# Patient Record
Sex: MALE | Race: WHITE | URBAN - METROPOLITAN AREA
[De-identification: names, ages, dates, MRNs, and addresses within clinical notes are randomized per-mention and may not be internally consistent; named-entity substitution may affect disease eponyms.]

---

## 2023-02-16 ENCOUNTER — OFFICE (OUTPATIENT)
Dept: URBAN - METROPOLITAN AREA CLINIC 112 | Facility: CLINIC | Age: 58
Setting detail: OPHTHALMOLOGY
End: 2023-02-16
Payer: COMMERCIAL

## 2023-02-16 DIAGNOSIS — H53.2: ICD-10-CM

## 2023-02-16 DIAGNOSIS — H35.033: ICD-10-CM

## 2023-02-16 DIAGNOSIS — E11.3392: ICD-10-CM

## 2023-02-16 DIAGNOSIS — H17.9: ICD-10-CM

## 2023-02-16 DIAGNOSIS — H16.223: ICD-10-CM

## 2023-02-16 DIAGNOSIS — E11.3391: ICD-10-CM

## 2023-02-16 PROCEDURE — 99214 OFFICE O/P EST MOD 30 MIN: CPT | Performed by: OPHTHALMOLOGY

## 2023-02-16 PROCEDURE — 92250 FUNDUS PHOTOGRAPHY W/I&R: CPT | Performed by: OPHTHALMOLOGY

## 2023-02-16 ASSESSMENT — CONFRONTATIONAL VISUAL FIELD TEST (CVF)
OD_FINDINGS: FULL
OS_FINDINGS: FULL

## 2023-02-16 ASSESSMENT — SPHEQUIV_DERIVED
OS_SPHEQUIV: 1
OS_SPHEQUIV: 0.875
OD_SPHEQUIV: 0.625
OD_SPHEQUIV: 0.25

## 2023-02-16 ASSESSMENT — REFRACTION_MANIFEST
OS_VA1: 20/25-2
OS_AXIS: 070
OD_VA1: 20/20
OS_CYLINDER: -0.50
OS_SPHERE: +1.25
OD_AXIS: 100
OD_CYLINDER: -1.25
OD_SPHERE: +1.25

## 2023-02-16 ASSESSMENT — KERATOMETRY
OS_K1POWER_DIOPTERS: 40.00
OS_AXISANGLE_DEGREES: 135
OD_K2POWER_DIOPTERS: 41.00
OS_K2POWER_DIOPTERS: 40.25
OD_AXISANGLE_DEGREES: 037
OD_K1POWER_DIOPTERS: 40.25

## 2023-02-16 ASSESSMENT — TONOMETRY: OS_IOP_MMHG: 14

## 2023-02-16 ASSESSMENT — REFRACTION_AUTOREFRACTION
OS_CYLINDER: -0.75
OS_AXIS: 070
OS_SPHERE: +1.25
OD_CYLINDER: -1.00
OD_SPHERE: +0.75
OD_AXIS: 099

## 2023-02-16 ASSESSMENT — VISUAL ACUITY
OS_BCVA: 20/20-1
OD_BCVA: 20/25

## 2023-02-16 ASSESSMENT — AXIALLENGTH_DERIVED
OD_AL: 24.4334
OS_AL: 24.4737
OS_AL: 24.5263
OD_AL: 24.5911

## 2023-02-16 ASSESSMENT — SUPERFICIAL PUNCTATE KERATITIS (SPK)
OS_SPK: T
OD_SPK: T

## 2023-02-20 ENCOUNTER — OFFICE (OUTPATIENT)
Dept: URBAN - METROPOLITAN AREA CLINIC 63 | Facility: CLINIC | Age: 58
Setting detail: OPHTHALMOLOGY
End: 2023-02-20
Payer: COMMERCIAL

## 2023-02-20 DIAGNOSIS — H43.393: ICD-10-CM

## 2023-02-20 DIAGNOSIS — H35.373: ICD-10-CM

## 2023-02-20 DIAGNOSIS — E11.3313: ICD-10-CM

## 2023-02-20 DIAGNOSIS — H35.033: ICD-10-CM

## 2023-02-20 DIAGNOSIS — E11.3311: ICD-10-CM

## 2023-02-20 PROCEDURE — 92134 CPTRZ OPH DX IMG PST SGM RTA: CPT | Performed by: OPHTHALMOLOGY

## 2023-02-20 PROCEDURE — 67210 TREATMENT OF RETINAL LESION: CPT | Performed by: OPHTHALMOLOGY

## 2023-02-20 PROCEDURE — 92235 FLUORESCEIN ANGRPH MLTIFRAME: CPT | Performed by: OPHTHALMOLOGY

## 2023-02-20 PROCEDURE — 92014 COMPRE OPH EXAM EST PT 1/>: CPT | Performed by: OPHTHALMOLOGY

## 2023-02-20 ASSESSMENT — SPHEQUIV_DERIVED
OS_SPHEQUIV: 0.875
OD_SPHEQUIV: 0.25

## 2023-02-20 ASSESSMENT — AXIALLENGTH_DERIVED
OS_AL: 24.5263
OD_AL: 24.5911

## 2023-02-20 ASSESSMENT — KERATOMETRY
OD_K2POWER_DIOPTERS: 41.00
OD_AXISANGLE_DEGREES: 037
OS_AXISANGLE_DEGREES: 135
OD_K1POWER_DIOPTERS: 40.25
OS_K2POWER_DIOPTERS: 40.25
OS_K1POWER_DIOPTERS: 40.00

## 2023-02-20 ASSESSMENT — REFRACTION_AUTOREFRACTION
OS_CYLINDER: -0.75
OD_AXIS: 099
OS_SPHERE: +1.25
OS_AXIS: 070
OD_SPHERE: +0.75
OD_CYLINDER: -1.00

## 2023-02-20 ASSESSMENT — TONOMETRY
OD_IOP_MMHG: 16
OS_IOP_MMHG: 16

## 2023-02-20 ASSESSMENT — VISUAL ACUITY
OD_BCVA: 20/30
OS_BCVA: 20/25

## 2023-02-20 ASSESSMENT — SUPERFICIAL PUNCTATE KERATITIS (SPK)
OS_SPK: T
OD_SPK: T

## 2023-02-20 ASSESSMENT — CONFRONTATIONAL VISUAL FIELD TEST (CVF)
OD_FINDINGS: FULL
OS_FINDINGS: FULL

## 2023-03-06 ENCOUNTER — OFFICE (OUTPATIENT)
Facility: LOCATION | Age: 58
Setting detail: OPHTHALMOLOGY
End: 2023-03-06
Payer: COMMERCIAL

## 2023-03-06 DIAGNOSIS — H53.2: ICD-10-CM

## 2023-03-06 DIAGNOSIS — S04.22XA: ICD-10-CM

## 2023-03-06 DIAGNOSIS — H53.433: ICD-10-CM

## 2023-03-06 PROBLEM — H35.373 EPIRETINAL MEMBRANE; BOTH EYES: Status: ACTIVE | Noted: 2023-02-20

## 2023-03-06 PROBLEM — H16.223 DRY EYE SYNDROME K SICCA; BOTH EYES: Status: ACTIVE | Noted: 2023-02-16

## 2023-03-06 PROBLEM — H43.393 VITREOUS FLOATERS; BOTH EYES: Status: ACTIVE | Noted: 2023-02-20

## 2023-03-06 PROBLEM — E11.3311 DM TYPE 2; RIGHT MOD WITH ME, LEFT MOD WITH ME: Status: ACTIVE | Noted: 2023-02-20

## 2023-03-06 PROBLEM — E11.3312 DM TYPE 2; RIGHT MOD WITH ME, LEFT MOD WITH ME: Status: ACTIVE | Noted: 2023-02-20

## 2023-03-06 PROCEDURE — 92133 CPTRZD OPH DX IMG PST SGM ON: CPT | Performed by: OPHTHALMOLOGY

## 2023-03-06 PROCEDURE — 92083 EXTENDED VISUAL FIELD XM: CPT | Performed by: OPHTHALMOLOGY

## 2023-03-06 PROCEDURE — 92012 INTRM OPH EXAM EST PATIENT: CPT | Performed by: OPHTHALMOLOGY

## 2023-03-06 ASSESSMENT — SUPERFICIAL PUNCTATE KERATITIS (SPK)
OD_SPK: T
OS_SPK: T

## 2023-03-06 ASSESSMENT — CONFRONTATIONAL VISUAL FIELD TEST (CVF)
OS_FINDINGS: FULL
OD_FINDINGS: FULL

## 2023-03-09 ASSESSMENT — REFRACTION_MANIFEST
OS_AXIS: 085
OS_SPHERE: +0.75
OS_VA1: 20/25
OD_CYLINDER: -1.25
OS_CYLINDER: -0.75
OD_AXIS: 100
OD_SPHERE: +1.50
OD_VA1: 20/25

## 2023-03-09 ASSESSMENT — KERATOMETRY
OD_K1POWER_DIOPTERS: 400.50
OD_K2POWER_DIOPTERS: 39.75
OS_K2POWER_DIOPTERS: 39.00
OS_K1POWER_DIOPTERS: 39.75
OS_AXISANGLE_DEGREES: 69
OD_AXISANGLE_DEGREES: 121

## 2023-03-09 ASSESSMENT — REFRACTION_AUTOREFRACTION
OS_SPHERE: +1.50
OS_CYLINDER: -0.75
OS_AXIS: 85
OD_SPHERE: +1.50
OD_CYLINDER: -1.25
OD_AXIS: 99

## 2023-03-09 ASSESSMENT — AXIALLENGTH_DERIVED
OS_AL: 24.7202
OD_AL: 6.27
OS_AL: 25.0453
OD_AL: 6.27

## 2023-03-09 ASSESSMENT — VISUAL ACUITY
OD_BCVA: 20/30
OS_BCVA: 20/25

## 2023-03-09 ASSESSMENT — SPHEQUIV_DERIVED
OS_SPHEQUIV: 1.125
OD_SPHEQUIV: 0.875
OD_SPHEQUIV: 0.875
OS_SPHEQUIV: 0.375

## 2023-03-10 ENCOUNTER — OFFICE (OUTPATIENT)
Dept: URBAN - METROPOLITAN AREA CLINIC 105 | Facility: CLINIC | Age: 58
Setting detail: OPHTHALMOLOGY
End: 2023-03-10
Payer: COMMERCIAL

## 2023-03-10 DIAGNOSIS — E11.3312: ICD-10-CM

## 2023-03-10 PROCEDURE — 67210 TREATMENT OF RETINAL LESION: CPT | Performed by: OPHTHALMOLOGY

## 2023-03-10 ASSESSMENT — VISUAL ACUITY
OD_BCVA: 20/30
OS_BCVA: 20/30

## 2023-03-10 ASSESSMENT — KERATOMETRY
OD_K1POWER_DIOPTERS: 400.50
OS_AXISANGLE_DEGREES: 69
OD_AXISANGLE_DEGREES: 121
OS_K2POWER_DIOPTERS: 39.00
OS_K1POWER_DIOPTERS: 39.75
OD_K2POWER_DIOPTERS: 39.75

## 2023-03-10 ASSESSMENT — REFRACTION_AUTOREFRACTION
OS_SPHERE: +1.50
OS_CYLINDER: -0.75
OS_AXIS: 85
OD_CYLINDER: -1.25
OD_AXIS: 99
OD_SPHERE: +1.50

## 2023-03-10 ASSESSMENT — CONFRONTATIONAL VISUAL FIELD TEST (CVF)
OS_FINDINGS: FULL
OD_FINDINGS: FULL

## 2023-03-10 ASSESSMENT — SPHEQUIV_DERIVED
OD_SPHEQUIV: 0.875
OS_SPHEQUIV: 1.125

## 2023-03-10 ASSESSMENT — AXIALLENGTH_DERIVED
OD_AL: 6.27
OS_AL: 24.7202

## 2023-03-10 ASSESSMENT — SUPERFICIAL PUNCTATE KERATITIS (SPK)
OS_SPK: T
OD_SPK: T

## 2023-03-15 ENCOUNTER — OFFICE (OUTPATIENT)
Dept: URBAN - METROPOLITAN AREA CLINIC 105 | Facility: CLINIC | Age: 58
Setting detail: OPHTHALMOLOGY
End: 2023-03-15
Payer: COMMERCIAL

## 2023-03-15 DIAGNOSIS — E11.3313: ICD-10-CM

## 2023-03-15 DIAGNOSIS — H43.393: ICD-10-CM

## 2023-03-15 DIAGNOSIS — H35.373: ICD-10-CM

## 2023-03-15 DIAGNOSIS — E11.3311: ICD-10-CM

## 2023-03-15 DIAGNOSIS — H35.033: ICD-10-CM

## 2023-03-15 PROCEDURE — J3490 CIME: Performed by: OPHTHALMOLOGY

## 2023-03-15 PROCEDURE — 92250 FUNDUS PHOTOGRAPHY W/I&R: CPT | Performed by: OPHTHALMOLOGY

## 2023-03-15 PROCEDURE — 67028 INJECTION EYE DRUG: CPT | Performed by: OPHTHALMOLOGY

## 2023-03-15 ASSESSMENT — CONFRONTATIONAL VISUAL FIELD TEST (CVF)
OS_FINDINGS: FULL
OD_FINDINGS: FULL

## 2023-03-15 ASSESSMENT — KERATOMETRY
OD_K2POWER_DIOPTERS: 39.75
OS_K2POWER_DIOPTERS: 39.00
OD_K1POWER_DIOPTERS: 400.50
OS_K1POWER_DIOPTERS: 39.75
OS_AXISANGLE_DEGREES: 69
OD_AXISANGLE_DEGREES: 121

## 2023-03-15 ASSESSMENT — VISUAL ACUITY
OS_BCVA: 20/30-2
OD_BCVA: 20/50

## 2023-03-15 ASSESSMENT — SUPERFICIAL PUNCTATE KERATITIS (SPK)
OD_SPK: T
OS_SPK: T

## 2023-03-24 ENCOUNTER — OFFICE (OUTPATIENT)
Dept: URBAN - METROPOLITAN AREA CLINIC 105 | Facility: CLINIC | Age: 58
Setting detail: OPHTHALMOLOGY
End: 2023-03-24
Payer: COMMERCIAL

## 2023-03-24 DIAGNOSIS — E11.3312: ICD-10-CM

## 2023-03-24 DIAGNOSIS — E11.3313: ICD-10-CM

## 2023-03-24 DIAGNOSIS — H43.393: ICD-10-CM

## 2023-03-24 DIAGNOSIS — H35.373: ICD-10-CM

## 2023-03-24 DIAGNOSIS — H35.033: ICD-10-CM

## 2023-03-24 PROCEDURE — J3490 CIME: Performed by: OPHTHALMOLOGY

## 2023-03-24 PROCEDURE — 67028 INJECTION EYE DRUG: CPT | Performed by: OPHTHALMOLOGY

## 2023-03-24 PROCEDURE — 92134 CPTRZ OPH DX IMG PST SGM RTA: CPT | Performed by: OPHTHALMOLOGY

## 2023-03-24 ASSESSMENT — KERATOMETRY
OS_AXISANGLE_DEGREES: 69
OS_K1POWER_DIOPTERS: 39.75
OD_K1POWER_DIOPTERS: 400.50
OD_K2POWER_DIOPTERS: 39.75
OS_K2POWER_DIOPTERS: 39.00
OD_AXISANGLE_DEGREES: 121

## 2023-03-24 ASSESSMENT — SUPERFICIAL PUNCTATE KERATITIS (SPK)
OD_SPK: T
OS_SPK: T

## 2023-03-24 ASSESSMENT — VISUAL ACUITY
OD_BCVA: 20/50+1
OS_BCVA: 20/25-1

## 2023-03-24 ASSESSMENT — CONFRONTATIONAL VISUAL FIELD TEST (CVF)
OS_FINDINGS: FULL
OD_FINDINGS: FULL

## 2023-03-29 ENCOUNTER — OFFICE (OUTPATIENT)
Dept: URBAN - METROPOLITAN AREA CLINIC 112 | Facility: CLINIC | Age: 58
Setting detail: OPHTHALMOLOGY
End: 2023-03-29
Payer: COMMERCIAL

## 2023-03-29 DIAGNOSIS — H16.223: ICD-10-CM

## 2023-03-29 DIAGNOSIS — H16.221: ICD-10-CM

## 2023-03-29 DIAGNOSIS — H16.222: ICD-10-CM

## 2023-03-29 DIAGNOSIS — H25.13: ICD-10-CM

## 2023-03-29 PROBLEM — E11.3312 DM TYPE 2; RIGHT MOD WITH ME, LEFT MOD WITH ME: Status: ACTIVE | Noted: 2023-03-15

## 2023-03-29 PROBLEM — H53.433 ARCUATE DEFECT; BOTH EYES: Status: ACTIVE | Noted: 2023-03-06

## 2023-03-29 PROBLEM — E11.3311 DM TYPE 2; RIGHT MOD WITH ME, LEFT MOD WITH ME: Status: ACTIVE | Noted: 2023-03-15

## 2023-03-29 PROBLEM — E11.3313 DM TYPE 2; RIGHT MOD WITH ME, LEFT MOD WITH ME: Status: ACTIVE | Noted: 2023-03-15

## 2023-03-29 PROCEDURE — 99213 OFFICE O/P EST LOW 20 MIN: CPT | Performed by: OPHTHALMOLOGY

## 2023-03-29 PROCEDURE — 83861 MICROFLUID ANALY TEARS: CPT | Performed by: OPHTHALMOLOGY

## 2023-03-29 ASSESSMENT — AXIALLENGTH_DERIVED
OS_AL: 24.8277
OD_AL: 6.27

## 2023-03-29 ASSESSMENT — TONOMETRY
OD_IOP_MMHG: 17
OS_IOP_MMHG: 14

## 2023-03-29 ASSESSMENT — REFRACTION_AUTOREFRACTION
OD_SPHERE: +1.25
OD_AXIS: 99
OS_AXIS: 075
OS_CYLINDER: -0.75
OD_CYLINDER: -1.25
OS_SPHERE: +1.25

## 2023-03-29 ASSESSMENT — KERATOMETRY
OD_K2POWER_DIOPTERS: 39.75
OS_K1POWER_DIOPTERS: 39.75
OS_AXISANGLE_DEGREES: 69
OS_K2POWER_DIOPTERS: 39.00
OD_K1POWER_DIOPTERS: 400.50
OD_AXISANGLE_DEGREES: 121

## 2023-03-29 ASSESSMENT — VISUAL ACUITY
OS_BCVA: 20/20
OD_BCVA: 20/40+1

## 2023-03-29 ASSESSMENT — CONFRONTATIONAL VISUAL FIELD TEST (CVF)
OS_FINDINGS: FULL
OD_FINDINGS: FULL

## 2023-03-29 ASSESSMENT — SUPERFICIAL PUNCTATE KERATITIS (SPK)
OD_SPK: T
OS_SPK: T

## 2023-03-29 ASSESSMENT — SPHEQUIV_DERIVED
OD_SPHEQUIV: 0.625
OS_SPHEQUIV: 0.875

## 2023-04-14 PROBLEM — Z00.00 ENCOUNTER FOR PREVENTIVE HEALTH EXAMINATION: Status: ACTIVE | Noted: 2023-04-14

## 2023-04-17 ENCOUNTER — NON-APPOINTMENT (OUTPATIENT)
Age: 58
End: 2023-04-17

## 2023-04-17 ENCOUNTER — APPOINTMENT (OUTPATIENT)
Dept: FAMILY MEDICINE | Facility: CLINIC | Age: 58
End: 2023-04-17
Payer: COMMERCIAL

## 2023-04-17 VITALS
RESPIRATION RATE: 15 BRPM | TEMPERATURE: 97.2 F | HEART RATE: 63 BPM | WEIGHT: 182 LBS | SYSTOLIC BLOOD PRESSURE: 140 MMHG | OXYGEN SATURATION: 97 % | DIASTOLIC BLOOD PRESSURE: 86 MMHG | BODY MASS INDEX: 25.48 KG/M2 | HEIGHT: 71 IN

## 2023-04-17 VITALS
HEART RATE: 92 BPM | WEIGHT: 217 LBS | TEMPERATURE: 97.5 F | BODY MASS INDEX: 32.14 KG/M2 | SYSTOLIC BLOOD PRESSURE: 160 MMHG | RESPIRATION RATE: 15 BRPM | HEIGHT: 69 IN | OXYGEN SATURATION: 98 % | DIASTOLIC BLOOD PRESSURE: 100 MMHG

## 2023-04-17 DIAGNOSIS — H35.60 RETINAL HEMORRHAGE, UNSPECIFIED EYE: ICD-10-CM

## 2023-04-17 DIAGNOSIS — I61.9 NONTRAUMATIC INTRACEREBRAL HEMORRHAGE, UNSPECIFIED: ICD-10-CM

## 2023-04-17 PROCEDURE — 99204 OFFICE O/P NEW MOD 45 MIN: CPT

## 2023-04-17 RX ORDER — METFORMIN HYDROCHLORIDE 1000 MG/1
1000 TABLET, COATED ORAL DAILY
Qty: 90 | Refills: 0 | Status: ACTIVE | COMMUNITY
Start: 2023-04-17 | End: 1900-01-01

## 2023-04-17 RX ORDER — CHLORHEXIDINE GLUCONATE, 0.12% ORAL RINSE 1.2 MG/ML
0.12 SOLUTION DENTAL
Qty: 473 | Refills: 0 | Status: ACTIVE | COMMUNITY
Start: 2023-04-04

## 2023-04-17 RX ORDER — AZITHROMYCIN 500 MG/1
500 TABLET, FILM COATED ORAL
Qty: 7 | Refills: 0 | Status: ACTIVE | COMMUNITY
Start: 2023-04-04

## 2023-04-17 RX ORDER — LABETALOL HYDROCHLORIDE 300 MG/1
300 TABLET, FILM COATED ORAL
Qty: 90 | Refills: 0 | Status: ACTIVE | COMMUNITY
Start: 2023-04-17 | End: 1900-01-01

## 2023-04-17 NOTE — HISTORY OF PRESENT ILLNESS
[FreeTextEntry1] : new [de-identified] : d tanvi  hemorrhages \par dental work\par wt down 280 to 217      63 lbs in a few months

## 2023-04-17 NOTE — HEALTH RISK ASSESSMENT
[0] : 2) Feeling down, depressed, or hopeless: Not at all (0) [PHQ-2 Negative - No further assessment needed] : PHQ-2 Negative - No further assessment needed [[id number]] : 0

## 2023-04-17 NOTE — PLAN
[FreeTextEntry1] : est of med care for 57 male \par d tanvi -  a1c 8.0   \par retinal hemorrhages   opth \par ^bp   --     160/100\par cerebral hemorrhages    nuero \par start labetol po 300 mg od \par obesity --   lost 65 lbs over 3 - 4 mos \par     diet and exercise discussed \par gingivitis --   for dental extraction    \par to be seen by cardio \par medically cleared \par

## 2023-04-19 ENCOUNTER — OFFICE (OUTPATIENT)
Dept: URBAN - METROPOLITAN AREA CLINIC 105 | Facility: CLINIC | Age: 58
Setting detail: OPHTHALMOLOGY
End: 2023-04-19
Payer: COMMERCIAL

## 2023-04-19 ENCOUNTER — APPOINTMENT (OUTPATIENT)
Dept: FAMILY MEDICINE | Facility: CLINIC | Age: 58
End: 2023-04-19
Payer: COMMERCIAL

## 2023-04-19 VITALS
OXYGEN SATURATION: 98 % | SYSTOLIC BLOOD PRESSURE: 150 MMHG | HEIGHT: 69 IN | HEART RATE: 83 BPM | WEIGHT: 217 LBS | BODY MASS INDEX: 32.14 KG/M2 | DIASTOLIC BLOOD PRESSURE: 90 MMHG | TEMPERATURE: 97.9 F | RESPIRATION RATE: 15 BRPM

## 2023-04-19 DIAGNOSIS — I10 ESSENTIAL (PRIMARY) HYPERTENSION: ICD-10-CM

## 2023-04-19 DIAGNOSIS — H35.033: ICD-10-CM

## 2023-04-19 DIAGNOSIS — E11.9 TYPE 2 DIABETES MELLITUS W/OUT COMPLICATIONS: ICD-10-CM

## 2023-04-19 DIAGNOSIS — E11.3311: ICD-10-CM

## 2023-04-19 DIAGNOSIS — H43.393: ICD-10-CM

## 2023-04-19 DIAGNOSIS — D69.6 THROMBOCYTOPENIA, UNSPECIFIED: ICD-10-CM

## 2023-04-19 DIAGNOSIS — K05.10 CHRONIC GINGIVITIS, PLAQUE INDUCED: ICD-10-CM

## 2023-04-19 DIAGNOSIS — H35.373: ICD-10-CM

## 2023-04-19 DIAGNOSIS — E11.3313: ICD-10-CM

## 2023-04-19 PROCEDURE — 99214 OFFICE O/P EST MOD 30 MIN: CPT

## 2023-04-19 PROCEDURE — 67028 INJECTION EYE DRUG: CPT | Performed by: OPHTHALMOLOGY

## 2023-04-19 PROCEDURE — 92134 CPTRZ OPH DX IMG PST SGM RTA: CPT | Performed by: OPHTHALMOLOGY

## 2023-04-19 ASSESSMENT — KERATOMETRY
OD_AXISANGLE_DEGREES: 121
OS_K2POWER_DIOPTERS: 39.00
OD_K2POWER_DIOPTERS: 39.75
OS_K1POWER_DIOPTERS: 39.75
OD_K1POWER_DIOPTERS: 400.50
OS_AXISANGLE_DEGREES: 69

## 2023-04-19 ASSESSMENT — REFRACTION_AUTOREFRACTION
OS_SPHERE: +1.50
OD_AXIS: 99
OD_CYLINDER: -1.25
OS_CYLINDER: -0.75
OD_SPHERE: +1.50
OS_AXIS: 85

## 2023-04-19 ASSESSMENT — SPHEQUIV_DERIVED
OD_SPHEQUIV: 0.875
OS_SPHEQUIV: 1.125

## 2023-04-19 ASSESSMENT — AXIALLENGTH_DERIVED
OS_AL: 24.7202
OD_AL: 6.27

## 2023-04-19 ASSESSMENT — SUPERFICIAL PUNCTATE KERATITIS (SPK)
OS_SPK: T
OD_SPK: T

## 2023-04-19 ASSESSMENT — CONFRONTATIONAL VISUAL FIELD TEST (CVF)
OS_FINDINGS: FULL
OD_FINDINGS: FULL

## 2023-04-19 NOTE — PLAN
[FreeTextEntry1] : This 57-year-old gentleman seeks medical clearance for  extraction and removal of her wisdom tooth\par Non-smoker/social drinker\par Gingivitis–following with DDS for extraction of wisdom tooth under IV sedation and local\par Hypertension–poorly compliant to medication.  Has recently been started on labetalol 300 mg once daily\par Blood pressure 150/90.  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  Poor compliance to medication\par Hemoglobin A1c poorly controlled\par Restarted on metformin 1 g daily.\par This 57-year-old gentleman will be seen by cardiology and endocrinology\par He is medically cleared for dental extraction

## 2023-04-19 NOTE — HISTORY OF PRESENT ILLNESS
[No Pertinent Cardiac History] : no history of aortic stenosis, atrial fibrillation, coronary artery disease, recent myocardial infarction, or implantable device/pacemaker [No Pertinent Pulmonary History] : no history of asthma, COPD, sleep apnea, or smoking [No Adverse Anesthesia Reaction] : no adverse anesthesia reaction in self or family member [Diabetes] : diabetes [(Patient denies any chest pain, claudication, dyspnea on exertion, orthopnea, palpitations or syncope)] : Patient denies any chest pain, claudication, dyspnea on exertion, orthopnea, palpitations or syncope [Good (7-10 METs)] : Good (7-10 METs) [Chronic Anticoagulation] : no chronic anticoagulation [Chronic Kidney Disease] : no chronic kidney disease [FreeTextEntry1] : Tooth extraction [FreeTextEntry4] : 57-year-old male for tooth extraction.  Scheduled to see Endo and cardio this week\par Diabetes mellitus type 2 with poor control\par Started on metformin this week\par Hypertension–150/90\par Started on labetalol 300 mg once daily [FreeTextEntry7] : For cardiology consultation

## 2023-04-21 ASSESSMENT — REFRACTION_AUTOREFRACTION
OD_SPHERE: +1.25
OD_AXIS: 99
OD_CYLINDER: -1.25
OS_AXIS: 075
OS_CYLINDER: -0.75
OS_SPHERE: +1.25

## 2023-04-21 ASSESSMENT — KERATOMETRY
OD_AXISANGLE_DEGREES: 121
OD_K2POWER_DIOPTERS: 39.75
OD_K1POWER_DIOPTERS: 400.50
OS_AXISANGLE_DEGREES: 69
OS_K1POWER_DIOPTERS: 39.75
OS_K2POWER_DIOPTERS: 39.00

## 2023-04-21 ASSESSMENT — AXIALLENGTH_DERIVED
OS_AL: 24.8277
OD_AL: 6.27

## 2023-04-21 ASSESSMENT — SPHEQUIV_DERIVED
OS_SPHEQUIV: 0.875
OD_SPHEQUIV: 0.625

## 2023-04-21 ASSESSMENT — VISUAL ACUITY
OS_BCVA: 20/30-2
OD_BCVA: 20/50

## 2023-04-28 ENCOUNTER — OFFICE (OUTPATIENT)
Dept: URBAN - METROPOLITAN AREA CLINIC 105 | Facility: CLINIC | Age: 58
Setting detail: OPHTHALMOLOGY
End: 2023-04-28
Payer: COMMERCIAL

## 2023-04-28 DIAGNOSIS — E11.3311: ICD-10-CM

## 2023-04-28 DIAGNOSIS — E11.3312: ICD-10-CM

## 2023-04-28 PROBLEM — H16.221 DRY EYE SYNDROME K SICCA; RIGHT EYE, LEFT EYE, BOTH EYES: Status: ACTIVE | Noted: 2023-04-19

## 2023-04-28 PROBLEM — H16.223 DRY EYE SYNDROME K SICCA; RIGHT EYE, LEFT EYE, BOTH EYES: Status: ACTIVE | Noted: 2023-04-19

## 2023-04-28 PROBLEM — H16.222 DRY EYE SYNDROME K SICCA; RIGHT EYE, LEFT EYE, BOTH EYES: Status: ACTIVE | Noted: 2023-04-19

## 2023-04-28 PROCEDURE — 92134 CPTRZ OPH DX IMG PST SGM RTA: CPT | Performed by: OPHTHALMOLOGY

## 2023-04-28 PROCEDURE — 67028 INJECTION EYE DRUG: CPT | Performed by: OPHTHALMOLOGY

## 2023-04-28 ASSESSMENT — REFRACTION_AUTOREFRACTION
OS_SPHERE: +1.25
OD_CYLINDER: -1.25
OD_SPHERE: +1.25
OD_AXIS: 99
OS_AXIS: 075
OS_CYLINDER: -0.75

## 2023-04-28 ASSESSMENT — KERATOMETRY
OD_AXISANGLE_DEGREES: 121
OD_K2POWER_DIOPTERS: 39.75
OS_K1POWER_DIOPTERS: 39.75
OS_K2POWER_DIOPTERS: 39.00
OD_K1POWER_DIOPTERS: 400.50
OS_AXISANGLE_DEGREES: 69

## 2023-04-28 ASSESSMENT — AXIALLENGTH_DERIVED
OS_AL: 24.8277
OD_AL: 6.27

## 2023-04-28 ASSESSMENT — CONFRONTATIONAL VISUAL FIELD TEST (CVF)
OS_FINDINGS: FULL
OD_FINDINGS: FULL

## 2023-04-28 ASSESSMENT — SPHEQUIV_DERIVED
OD_SPHEQUIV: 0.625
OS_SPHEQUIV: 0.875

## 2023-04-28 ASSESSMENT — VISUAL ACUITY
OS_BCVA: 20/25-
OD_BCVA: 20/30

## 2023-04-28 ASSESSMENT — SUPERFICIAL PUNCTATE KERATITIS (SPK)
OD_SPK: T
OS_SPK: T

## 2023-05-22 ENCOUNTER — OFFICE (OUTPATIENT)
Dept: URBAN - METROPOLITAN AREA CLINIC 105 | Facility: CLINIC | Age: 58
Setting detail: OPHTHALMOLOGY
End: 2023-05-22
Payer: COMMERCIAL

## 2023-05-22 DIAGNOSIS — E11.3311: ICD-10-CM

## 2023-05-22 DIAGNOSIS — H35.373: ICD-10-CM

## 2023-05-22 DIAGNOSIS — E11.3313: ICD-10-CM

## 2023-05-22 DIAGNOSIS — E11.3312: ICD-10-CM

## 2023-05-22 PROCEDURE — 67028 INJECTION EYE DRUG: CPT | Performed by: OPHTHALMOLOGY

## 2023-05-22 PROCEDURE — 99024 POSTOP FOLLOW-UP VISIT: CPT | Performed by: OPHTHALMOLOGY

## 2023-05-22 PROCEDURE — 92134 CPTRZ OPH DX IMG PST SGM RTA: CPT | Performed by: OPHTHALMOLOGY

## 2023-05-22 ASSESSMENT — SUPERFICIAL PUNCTATE KERATITIS (SPK)
OD_SPK: T
OS_SPK: T

## 2023-05-22 ASSESSMENT — REFRACTION_AUTOREFRACTION
OD_CYLINDER: -1.25
OS_CYLINDER: -0.75
OD_AXIS: 99
OS_SPHERE: +1.25
OS_AXIS: 075
OD_SPHERE: +1.25

## 2023-05-22 ASSESSMENT — KERATOMETRY
OD_K2POWER_DIOPTERS: 39.75
OD_K1POWER_DIOPTERS: 400.50
OS_AXISANGLE_DEGREES: 69
OS_K1POWER_DIOPTERS: 39.75
OS_K2POWER_DIOPTERS: 39.00
OD_AXISANGLE_DEGREES: 121

## 2023-05-22 ASSESSMENT — AXIALLENGTH_DERIVED
OD_AL: 6.27
OS_AL: 24.8277

## 2023-05-22 ASSESSMENT — VISUAL ACUITY
OS_BCVA: 20/25-
OD_BCVA: 20/40

## 2023-05-22 ASSESSMENT — SPHEQUIV_DERIVED
OD_SPHEQUIV: 0.625
OS_SPHEQUIV: 0.875

## 2023-05-22 ASSESSMENT — CONFRONTATIONAL VISUAL FIELD TEST (CVF)
OD_FINDINGS: FULL
OS_FINDINGS: FULL

## 2023-05-26 ENCOUNTER — OFFICE (OUTPATIENT)
Dept: URBAN - METROPOLITAN AREA CLINIC 105 | Facility: CLINIC | Age: 58
Setting detail: OPHTHALMOLOGY
End: 2023-05-26
Payer: COMMERCIAL

## 2023-05-26 DIAGNOSIS — E11.3311: ICD-10-CM

## 2023-05-26 PROCEDURE — 67210 TREATMENT OF RETINAL LESION: CPT | Performed by: OPHTHALMOLOGY

## 2023-05-26 ASSESSMENT — AXIALLENGTH_DERIVED
OD_AL: 6.27
OS_AL: 24.8277

## 2023-05-26 ASSESSMENT — VISUAL ACUITY
OS_BCVA: 20/30-2
OD_BCVA: 20/30

## 2023-05-26 ASSESSMENT — KERATOMETRY
OD_AXISANGLE_DEGREES: 121
OS_K1POWER_DIOPTERS: 39.75
OS_K2POWER_DIOPTERS: 39.00
OD_K2POWER_DIOPTERS: 39.75
OS_AXISANGLE_DEGREES: 69
OD_K1POWER_DIOPTERS: 400.50

## 2023-05-26 ASSESSMENT — REFRACTION_AUTOREFRACTION
OS_SPHERE: +1.25
OS_AXIS: 075
OD_SPHERE: +1.25
OS_CYLINDER: -0.75
OD_CYLINDER: -1.25
OD_AXIS: 99

## 2023-05-26 ASSESSMENT — CONFRONTATIONAL VISUAL FIELD TEST (CVF)
OD_FINDINGS: FULL
OS_FINDINGS: FULL

## 2023-05-26 ASSESSMENT — SPHEQUIV_DERIVED
OS_SPHEQUIV: 0.875
OD_SPHEQUIV: 0.625

## 2023-05-26 ASSESSMENT — SUPERFICIAL PUNCTATE KERATITIS (SPK)
OS_SPK: T
OD_SPK: T

## 2023-06-09 ENCOUNTER — OFFICE (OUTPATIENT)
Dept: URBAN - METROPOLITAN AREA CLINIC 105 | Facility: CLINIC | Age: 58
Setting detail: OPHTHALMOLOGY
End: 2023-06-09
Payer: COMMERCIAL

## 2023-06-09 DIAGNOSIS — E11.3312: ICD-10-CM

## 2023-06-09 DIAGNOSIS — E11.3311: ICD-10-CM

## 2023-06-09 DIAGNOSIS — E11.3313: ICD-10-CM

## 2023-06-09 DIAGNOSIS — H43.393: ICD-10-CM

## 2023-06-09 DIAGNOSIS — H35.373: ICD-10-CM

## 2023-06-09 DIAGNOSIS — H35.033: ICD-10-CM

## 2023-06-09 PROCEDURE — 67028 INJECTION EYE DRUG: CPT | Performed by: OPHTHALMOLOGY

## 2023-06-09 PROCEDURE — 92134 CPTRZ OPH DX IMG PST SGM RTA: CPT | Performed by: OPHTHALMOLOGY

## 2023-06-09 ASSESSMENT — KERATOMETRY
OS_K2POWER_DIOPTERS: 39.00
OS_AXISANGLE_DEGREES: 69
OD_K2POWER_DIOPTERS: 39.75
OD_K1POWER_DIOPTERS: 400.50
OD_AXISANGLE_DEGREES: 121
OS_K1POWER_DIOPTERS: 39.75

## 2023-06-09 ASSESSMENT — CONFRONTATIONAL VISUAL FIELD TEST (CVF)
OD_FINDINGS: FULL
OS_FINDINGS: FULL

## 2023-06-09 ASSESSMENT — REFRACTION_AUTOREFRACTION
OD_CYLINDER: -1.25
OS_AXIS: 075
OD_SPHERE: +1.25
OS_CYLINDER: -0.75
OD_AXIS: 99
OS_SPHERE: +1.25

## 2023-06-09 ASSESSMENT — SPHEQUIV_DERIVED
OD_SPHEQUIV: 0.625
OS_SPHEQUIV: 0.875

## 2023-06-09 ASSESSMENT — SUPERFICIAL PUNCTATE KERATITIS (SPK)
OD_SPK: T
OS_SPK: T

## 2023-06-09 ASSESSMENT — VISUAL ACUITY
OS_BCVA: 20/25-1
OD_BCVA: 20/40

## 2023-06-09 ASSESSMENT — AXIALLENGTH_DERIVED
OS_AL: 24.8277
OD_AL: 6.27

## 2023-06-09 ASSESSMENT — TONOMETRY
OS_IOP_MMHG: 15
OD_IOP_MMHG: 19

## 2023-06-12 ENCOUNTER — OFFICE (OUTPATIENT)
Dept: URBAN - METROPOLITAN AREA CLINIC 105 | Facility: CLINIC | Age: 58
Setting detail: OPHTHALMOLOGY
End: 2023-06-12
Payer: COMMERCIAL

## 2023-06-12 DIAGNOSIS — E11.3312: ICD-10-CM

## 2023-06-12 PROCEDURE — 67210 TREATMENT OF RETINAL LESION: CPT | Performed by: OPHTHALMOLOGY

## 2023-06-12 ASSESSMENT — AXIALLENGTH_DERIVED
OD_AL: 6.27
OS_AL: 24.8277

## 2023-06-12 ASSESSMENT — TONOMETRY
OD_IOP_MMHG: 18
OS_IOP_MMHG: 18

## 2023-06-12 ASSESSMENT — KERATOMETRY
OD_K1POWER_DIOPTERS: 400.50
OS_K1POWER_DIOPTERS: 39.75
OD_K2POWER_DIOPTERS: 39.75
OD_AXISANGLE_DEGREES: 121
OS_K2POWER_DIOPTERS: 39.00
OS_AXISANGLE_DEGREES: 69

## 2023-06-12 ASSESSMENT — SPHEQUIV_DERIVED
OD_SPHEQUIV: 0.625
OS_SPHEQUIV: 0.875

## 2023-06-12 ASSESSMENT — SUPERFICIAL PUNCTATE KERATITIS (SPK)
OS_SPK: T
OD_SPK: T

## 2023-06-12 ASSESSMENT — CONFRONTATIONAL VISUAL FIELD TEST (CVF)
OD_FINDINGS: FULL
OS_FINDINGS: FULL

## 2023-06-12 ASSESSMENT — VISUAL ACUITY
OD_BCVA: 20/40+
OS_BCVA: 20/40+2

## 2023-06-12 ASSESSMENT — REFRACTION_AUTOREFRACTION
OD_CYLINDER: -1.25
OD_AXIS: 99
OD_SPHERE: +1.25
OS_SPHERE: +1.25
OS_AXIS: 075
OS_CYLINDER: -0.75

## 2023-06-24 ENCOUNTER — OFFICE (OUTPATIENT)
Dept: URBAN - METROPOLITAN AREA CLINIC 115 | Facility: CLINIC | Age: 58
Setting detail: OPHTHALMOLOGY
End: 2023-06-24
Payer: COMMERCIAL

## 2023-06-24 DIAGNOSIS — S04.22XA: ICD-10-CM

## 2023-06-24 DIAGNOSIS — E11.3312: ICD-10-CM

## 2023-06-24 DIAGNOSIS — E11.3311: ICD-10-CM

## 2023-06-24 DIAGNOSIS — H25.13: ICD-10-CM

## 2023-06-24 DIAGNOSIS — E11.3313: ICD-10-CM

## 2023-06-24 DIAGNOSIS — H00.014: ICD-10-CM

## 2023-06-24 DIAGNOSIS — H53.2: ICD-10-CM

## 2023-06-24 PROCEDURE — 99213 OFFICE O/P EST LOW 20 MIN: CPT | Performed by: OPHTHALMOLOGY

## 2023-06-24 ASSESSMENT — KERATOMETRY
OS_AXISANGLE_DEGREES: 69
OD_AXISANGLE_DEGREES: 121
OD_K1POWER_DIOPTERS: 400.50
OD_K2POWER_DIOPTERS: 39.75
OS_K2POWER_DIOPTERS: 39.00
OS_K1POWER_DIOPTERS: 39.75

## 2023-06-24 ASSESSMENT — REFRACTION_AUTOREFRACTION
OS_SPHERE: +1.50
OD_SPHERE: +1.25
OS_CYLINDER: -0.75
OD_AXIS: 087
OD_CYLINDER: +0.75
OS_AXIS: 052

## 2023-06-24 ASSESSMENT — SPHEQUIV_DERIVED
OS_SPHEQUIV: 1.125
OD_SPHEQUIV: 1.625

## 2023-06-24 ASSESSMENT — AXIALLENGTH_DERIVED
OS_AL: 24.7202
OD_AL: 6.25

## 2023-06-24 ASSESSMENT — VISUAL ACUITY
OS_BCVA: 20/50
OD_BCVA: 20/50

## 2023-06-24 ASSESSMENT — SUPERFICIAL PUNCTATE KERATITIS (SPK)
OS_SPK: T
OD_SPK: T

## 2023-06-24 ASSESSMENT — CONFRONTATIONAL VISUAL FIELD TEST (CVF)
OD_FINDINGS: FULL
OS_FINDINGS: FULL

## 2023-06-24 ASSESSMENT — TONOMETRY
OS_IOP_MMHG: 13
OD_IOP_MMHG: 16

## 2023-07-20 ASSESSMENT — REFRACTION_AUTOREFRACTION
OD_SPHERE: +1.50
OS_AXIS: 85
OS_SPHERE: +1.50
OD_AXIS: 99
OD_CYLINDER: -1.25
OS_CYLINDER: -0.75

## 2023-07-20 ASSESSMENT — AXIALLENGTH_DERIVED
OS_AL: 24.7202
OD_AL: 6.27

## 2023-07-20 ASSESSMENT — SPHEQUIV_DERIVED
OS_SPHEQUIV: 1.125
OD_SPHEQUIV: 0.875

## 2023-07-20 ASSESSMENT — KERATOMETRY
OD_K2POWER_DIOPTERS: 39.75
OD_K1POWER_DIOPTERS: 400.50
OS_K2POWER_DIOPTERS: 39.00
OS_AXISANGLE_DEGREES: 69
OD_AXISANGLE_DEGREES: 121
OS_K1POWER_DIOPTERS: 39.75

## 2023-07-24 ENCOUNTER — OFFICE (OUTPATIENT)
Dept: URBAN - METROPOLITAN AREA CLINIC 105 | Facility: CLINIC | Age: 58
Setting detail: OPHTHALMOLOGY
End: 2023-07-24
Payer: COMMERCIAL

## 2023-07-24 DIAGNOSIS — H43.393: ICD-10-CM

## 2023-07-24 DIAGNOSIS — E11.3313: ICD-10-CM

## 2023-07-24 DIAGNOSIS — E11.3311: ICD-10-CM

## 2023-07-24 DIAGNOSIS — H35.033: ICD-10-CM

## 2023-07-24 DIAGNOSIS — E11.3312: ICD-10-CM

## 2023-07-24 DIAGNOSIS — H35.373: ICD-10-CM

## 2023-07-24 PROBLEM — H16.222 DRY EYE SYNDROME K SICCA; RIGHT EYE, LEFT EYE, BOTH EYES: Status: ACTIVE | Noted: 2023-07-24

## 2023-07-24 PROBLEM — H16.223 DRY EYE SYNDROME K SICCA; RIGHT EYE, LEFT EYE, BOTH EYES: Status: ACTIVE | Noted: 2023-07-24

## 2023-07-24 PROBLEM — H16.221 DRY EYE SYNDROME K SICCA; RIGHT EYE, LEFT EYE, BOTH EYES: Status: ACTIVE | Noted: 2023-07-24

## 2023-07-24 PROCEDURE — 67028 INJECTION EYE DRUG: CPT | Performed by: OPHTHALMOLOGY

## 2023-07-24 PROCEDURE — 92134 CPTRZ OPH DX IMG PST SGM RTA: CPT | Performed by: OPHTHALMOLOGY

## 2023-07-24 ASSESSMENT — SUPERFICIAL PUNCTATE KERATITIS (SPK)
OS_SPK: T
OD_SPK: T

## 2023-07-24 ASSESSMENT — TONOMETRY
OD_IOP_MMHG: 18
OS_IOP_MMHG: 18

## 2023-07-24 ASSESSMENT — CONFRONTATIONAL VISUAL FIELD TEST (CVF)
OS_FINDINGS: FULL
OD_FINDINGS: FULL

## 2023-07-25 ENCOUNTER — RX RENEWAL (OUTPATIENT)
Age: 58
End: 2023-07-25

## 2023-07-27 ASSESSMENT — REFRACTION_AUTOREFRACTION
OS_AXIS: 052
OD_AXIS: 087
OD_SPHERE: +1.25
OS_SPHERE: +1.50
OS_CYLINDER: -0.75
OD_CYLINDER: +0.75

## 2023-07-27 ASSESSMENT — KERATOMETRY
OD_AXISANGLE_DEGREES: 121
OD_K2POWER_DIOPTERS: 39.75
OD_K1POWER_DIOPTERS: 400.50
OS_K2POWER_DIOPTERS: 39.00
OS_K1POWER_DIOPTERS: 39.75
OS_AXISANGLE_DEGREES: 69

## 2023-07-27 ASSESSMENT — VISUAL ACUITY
OD_BCVA: 20/100
OS_BCVA: 20/25-1

## 2023-07-27 ASSESSMENT — AXIALLENGTH_DERIVED
OS_AL: 24.7202
OD_AL: 6.25

## 2023-07-27 ASSESSMENT — SPHEQUIV_DERIVED
OD_SPHEQUIV: 1.625
OS_SPHEQUIV: 1.125

## 2023-08-28 ENCOUNTER — OFFICE (OUTPATIENT)
Dept: URBAN - METROPOLITAN AREA CLINIC 105 | Facility: CLINIC | Age: 58
Setting detail: OPHTHALMOLOGY
End: 2023-08-28
Payer: COMMERCIAL

## 2023-08-28 DIAGNOSIS — E11.3311: ICD-10-CM

## 2023-08-28 PROCEDURE — 67210 TREATMENT OF RETINAL LESION: CPT | Performed by: OPHTHALMOLOGY

## 2023-08-28 ASSESSMENT — REFRACTION_AUTOREFRACTION
OS_CYLINDER: -0.75
OS_SPHERE: +1.50
OS_AXIS: 052
OD_AXIS: 087
OD_CYLINDER: +0.75
OD_SPHERE: +1.25

## 2023-08-28 ASSESSMENT — KERATOMETRY
OS_K1POWER_DIOPTERS: 39.75
OS_AXISANGLE_DEGREES: 69
OD_K1POWER_DIOPTERS: 400.50
OD_AXISANGLE_DEGREES: 121
OS_K2POWER_DIOPTERS: 39.00
OD_K2POWER_DIOPTERS: 39.75

## 2023-08-28 ASSESSMENT — AXIALLENGTH_DERIVED
OD_AL: 6.25
OS_AL: 24.7202

## 2023-08-28 ASSESSMENT — TONOMETRY
OS_IOP_MMHG: 17
OD_IOP_MMHG: 10

## 2023-08-28 ASSESSMENT — CONFRONTATIONAL VISUAL FIELD TEST (CVF)
OS_FINDINGS: FULL
OD_FINDINGS: FULL

## 2023-08-28 ASSESSMENT — SPHEQUIV_DERIVED
OS_SPHEQUIV: 1.125
OD_SPHEQUIV: 1.625

## 2023-08-28 ASSESSMENT — VISUAL ACUITY
OS_BCVA: 20/40
OD_BCVA: 20/60-1

## 2023-08-28 ASSESSMENT — SUPERFICIAL PUNCTATE KERATITIS (SPK)
OD_SPK: T
OS_SPK: T

## 2023-09-20 ENCOUNTER — OFFICE (OUTPATIENT)
Dept: URBAN - METROPOLITAN AREA CLINIC 105 | Facility: CLINIC | Age: 58
Setting detail: OPHTHALMOLOGY
End: 2023-09-20
Payer: COMMERCIAL

## 2023-09-20 DIAGNOSIS — E11.3312: ICD-10-CM

## 2023-09-20 PROCEDURE — 67210 TREATMENT OF RETINAL LESION: CPT | Performed by: OPHTHALMOLOGY

## 2023-09-20 ASSESSMENT — KERATOMETRY
OS_K1POWER_DIOPTERS: 39.75
OD_K1POWER_DIOPTERS: 400.50
OS_K2POWER_DIOPTERS: 39.00
OS_AXISANGLE_DEGREES: 69
OD_K2POWER_DIOPTERS: 39.75
OD_AXISANGLE_DEGREES: 121

## 2023-09-20 ASSESSMENT — AXIALLENGTH_DERIVED
OD_AL: 6.25
OS_AL: 24.7202

## 2023-09-20 ASSESSMENT — CONFRONTATIONAL VISUAL FIELD TEST (CVF)
OD_FINDINGS: FULL
OS_FINDINGS: FULL

## 2023-09-20 ASSESSMENT — REFRACTION_AUTOREFRACTION
OD_AXIS: 087
OD_SPHERE: +1.25
OS_CYLINDER: -0.75
OS_AXIS: 052
OD_CYLINDER: +0.75
OS_SPHERE: +1.50

## 2023-09-20 ASSESSMENT — SUPERFICIAL PUNCTATE KERATITIS (SPK)
OS_SPK: T
OD_SPK: T

## 2023-09-20 ASSESSMENT — TONOMETRY
OD_IOP_MMHG: 17
OS_IOP_MMHG: 18

## 2023-09-20 ASSESSMENT — VISUAL ACUITY
OD_BCVA: 20/70-
OS_BCVA: 20/40

## 2023-09-20 ASSESSMENT — SPHEQUIV_DERIVED
OD_SPHEQUIV: 1.625
OS_SPHEQUIV: 1.125

## 2023-10-05 ENCOUNTER — OFFICE (OUTPATIENT)
Dept: URBAN - METROPOLITAN AREA CLINIC 112 | Facility: CLINIC | Age: 58
Setting detail: OPHTHALMOLOGY
End: 2023-10-05
Payer: COMMERCIAL

## 2023-10-05 DIAGNOSIS — H16.221: ICD-10-CM

## 2023-10-05 DIAGNOSIS — H25.13: ICD-10-CM

## 2023-10-05 DIAGNOSIS — H17.9: ICD-10-CM

## 2023-10-05 DIAGNOSIS — H16.223: ICD-10-CM

## 2023-10-05 DIAGNOSIS — H16.222: ICD-10-CM

## 2023-10-05 PROCEDURE — 99213 OFFICE O/P EST LOW 20 MIN: CPT | Mod: 24 | Performed by: OPHTHALMOLOGY

## 2023-10-05 ASSESSMENT — REFRACTION_AUTOREFRACTION
OD_AXIS: 098
OS_AXIS: 073
OD_CYLINDER: -1.25
OD_SPHERE: +1.50
OS_SPHERE: +2.00
OS_CYLINDER: -1.00

## 2023-10-05 ASSESSMENT — CONFRONTATIONAL VISUAL FIELD TEST (CVF)
OS_FINDINGS: FULL
OD_FINDINGS: FULL

## 2023-10-05 ASSESSMENT — KERATOMETRY
OS_K2POWER_DIOPTERS: 39.00
OS_AXISANGLE_DEGREES: 69
OD_K2POWER_DIOPTERS: 39.75
OD_AXISANGLE_DEGREES: 121
OS_K1POWER_DIOPTERS: 39.75
OD_K1POWER_DIOPTERS: 400.50

## 2023-10-05 ASSESSMENT — TONOMETRY
OD_IOP_MMHG: 13
OS_IOP_MMHG: 13

## 2023-10-05 ASSESSMENT — SUPERFICIAL PUNCTATE KERATITIS (SPK)
OD_SPK: T
OS_SPK: T

## 2023-10-05 ASSESSMENT — AXIALLENGTH_DERIVED
OD_AL: 6.27
OS_AL: 24.5609

## 2023-10-05 ASSESSMENT — SPHEQUIV_DERIVED
OD_SPHEQUIV: 0.875
OS_SPHEQUIV: 1.5

## 2023-10-05 ASSESSMENT — VISUAL ACUITY
OD_BCVA: 20/40
OS_BCVA: 20/25+1

## 2023-10-18 ENCOUNTER — OFFICE (OUTPATIENT)
Dept: URBAN - METROPOLITAN AREA CLINIC 105 | Facility: CLINIC | Age: 58
Setting detail: OPHTHALMOLOGY
End: 2023-10-18
Payer: COMMERCIAL

## 2023-10-18 DIAGNOSIS — E11.3313: ICD-10-CM

## 2023-10-18 PROCEDURE — 99024 POSTOP FOLLOW-UP VISIT: CPT | Performed by: OPHTHALMOLOGY

## 2023-10-18 PROCEDURE — 67028 INJECTION EYE DRUG: CPT | Mod: 58,50 | Performed by: OPHTHALMOLOGY

## 2023-10-18 PROCEDURE — 92134 CPTRZ OPH DX IMG PST SGM RTA: CPT | Performed by: OPHTHALMOLOGY

## 2023-10-18 ASSESSMENT — REFRACTION_AUTOREFRACTION
OD_SPHERE: +1.50
OS_CYLINDER: -1.00
OS_AXIS: 073
OD_CYLINDER: -1.25
OS_SPHERE: +2.00
OD_AXIS: 098

## 2023-10-18 ASSESSMENT — SUPERFICIAL PUNCTATE KERATITIS (SPK)
OS_SPK: T
OD_SPK: T

## 2023-10-18 ASSESSMENT — KERATOMETRY
OD_AXISANGLE_DEGREES: 121
OS_AXISANGLE_DEGREES: 69
OD_K1POWER_DIOPTERS: 400.50
OS_K2POWER_DIOPTERS: 39.00
OS_K1POWER_DIOPTERS: 39.75
OD_K2POWER_DIOPTERS: 39.75

## 2023-10-18 ASSESSMENT — TONOMETRY
OD_IOP_MMHG: 18
OS_IOP_MMHG: 17

## 2023-10-18 ASSESSMENT — AXIALLENGTH_DERIVED
OS_AL: 24.5609
OD_AL: 6.27

## 2023-10-18 ASSESSMENT — CONFRONTATIONAL VISUAL FIELD TEST (CVF)
OS_FINDINGS: FULL
OD_FINDINGS: FULL

## 2023-10-18 ASSESSMENT — VISUAL ACUITY
OS_BCVA: 20/25-
OD_BCVA: 20/30-

## 2023-10-18 ASSESSMENT — SPHEQUIV_DERIVED
OD_SPHEQUIV: 0.875
OS_SPHEQUIV: 1.5

## 2023-12-11 ENCOUNTER — OFFICE (OUTPATIENT)
Dept: URBAN - METROPOLITAN AREA CLINIC 105 | Facility: CLINIC | Age: 58
Setting detail: OPHTHALMOLOGY
End: 2023-12-11
Payer: COMMERCIAL

## 2023-12-11 DIAGNOSIS — E11.3311: ICD-10-CM

## 2023-12-11 PROCEDURE — 67210 TREATMENT OF RETINAL LESION: CPT | Mod: 79,RT | Performed by: OPHTHALMOLOGY

## 2023-12-11 ASSESSMENT — REFRACTION_AUTOREFRACTION
OD_CYLINDER: -1.25
OS_CYLINDER: -1.00
OS_AXIS: 073
OS_SPHERE: +2.00
OD_AXIS: 098
OD_SPHERE: +1.50

## 2023-12-11 ASSESSMENT — CONFRONTATIONAL VISUAL FIELD TEST (CVF)
OD_FINDINGS: FULL
OS_FINDINGS: FULL

## 2023-12-11 ASSESSMENT — SPHEQUIV_DERIVED
OS_SPHEQUIV: 1.5
OD_SPHEQUIV: 0.875

## 2023-12-11 ASSESSMENT — SUPERFICIAL PUNCTATE KERATITIS (SPK)
OS_SPK: T
OD_SPK: T

## 2023-12-22 ENCOUNTER — OFFICE (OUTPATIENT)
Dept: URBAN - METROPOLITAN AREA CLINIC 105 | Facility: CLINIC | Age: 58
Setting detail: OPHTHALMOLOGY
End: 2023-12-22
Payer: COMMERCIAL

## 2023-12-22 DIAGNOSIS — E11.3312: ICD-10-CM

## 2023-12-22 PROCEDURE — 67210 TREATMENT OF RETINAL LESION: CPT | Mod: 79,LT | Performed by: OPHTHALMOLOGY

## 2023-12-22 ASSESSMENT — SPHEQUIV_DERIVED
OS_SPHEQUIV: 1.5
OD_SPHEQUIV: 0.875

## 2023-12-22 ASSESSMENT — REFRACTION_AUTOREFRACTION
OS_AXIS: 073
OD_SPHERE: +1.50
OS_CYLINDER: -1.00
OD_CYLINDER: -1.25
OD_AXIS: 098
OS_SPHERE: +2.00

## 2023-12-22 ASSESSMENT — SUPERFICIAL PUNCTATE KERATITIS (SPK)
OD_SPK: T
OS_SPK: T

## 2023-12-22 ASSESSMENT — CONFRONTATIONAL VISUAL FIELD TEST (CVF)
OD_FINDINGS: FULL
OS_FINDINGS: FULL

## 2024-01-17 ENCOUNTER — OFFICE (OUTPATIENT)
Dept: URBAN - METROPOLITAN AREA CLINIC 105 | Facility: CLINIC | Age: 59
Setting detail: OPHTHALMOLOGY
End: 2024-01-17
Payer: COMMERCIAL

## 2024-01-17 DIAGNOSIS — H35.373: ICD-10-CM

## 2024-01-17 DIAGNOSIS — E11.3313: ICD-10-CM

## 2024-01-17 PROCEDURE — 92134 CPTRZ OPH DX IMG PST SGM RTA: CPT | Performed by: OPHTHALMOLOGY

## 2024-01-17 PROCEDURE — 99024 POSTOP FOLLOW-UP VISIT: CPT | Performed by: OPHTHALMOLOGY

## 2024-01-17 PROCEDURE — 67028 INJECTION EYE DRUG: CPT | Mod: 58,50 | Performed by: OPHTHALMOLOGY

## 2024-01-17 ASSESSMENT — REFRACTION_AUTOREFRACTION
OD_SPHERE: +1.50
OD_CYLINDER: -1.25
OD_AXIS: 098
OS_SPHERE: +2.00
OS_CYLINDER: -1.00
OS_AXIS: 073

## 2024-01-17 ASSESSMENT — CONFRONTATIONAL VISUAL FIELD TEST (CVF)
OD_FINDINGS: FULL
OS_FINDINGS: FULL

## 2024-01-17 ASSESSMENT — SUPERFICIAL PUNCTATE KERATITIS (SPK)
OD_SPK: T
OS_SPK: T

## 2024-01-17 ASSESSMENT — SPHEQUIV_DERIVED
OS_SPHEQUIV: 1.5
OD_SPHEQUIV: 0.875